# Patient Record
Sex: FEMALE | Race: NATIVE HAWAIIAN OR OTHER PACIFIC ISLANDER | NOT HISPANIC OR LATINO
[De-identification: names, ages, dates, MRNs, and addresses within clinical notes are randomized per-mention and may not be internally consistent; named-entity substitution may affect disease eponyms.]

---

## 2018-05-29 ENCOUNTER — TRANSCRIPTION ENCOUNTER (OUTPATIENT)
Age: 63
End: 2018-05-29

## 2018-06-03 ENCOUNTER — TRANSCRIPTION ENCOUNTER (OUTPATIENT)
Age: 63
End: 2018-06-03

## 2018-07-30 ENCOUNTER — TRANSCRIPTION ENCOUNTER (OUTPATIENT)
Age: 63
End: 2018-07-30

## 2018-07-31 PROBLEM — Z00.00 ENCOUNTER FOR PREVENTIVE HEALTH EXAMINATION: Status: ACTIVE | Noted: 2018-07-31

## 2018-11-01 ENCOUNTER — TRANSCRIPTION ENCOUNTER (OUTPATIENT)
Age: 63
End: 2018-11-01

## 2019-01-07 ENCOUNTER — TRANSCRIPTION ENCOUNTER (OUTPATIENT)
Age: 64
End: 2019-01-07

## 2019-08-20 ENCOUNTER — TRANSCRIPTION ENCOUNTER (OUTPATIENT)
Age: 64
End: 2019-08-20

## 2020-06-10 ENCOUNTER — TRANSCRIPTION ENCOUNTER (OUTPATIENT)
Age: 65
End: 2020-06-10

## 2020-10-31 ENCOUNTER — TRANSCRIPTION ENCOUNTER (OUTPATIENT)
Age: 65
End: 2020-10-31

## 2024-04-24 ENCOUNTER — INPATIENT (INPATIENT)
Facility: HOSPITAL | Age: 69
LOS: 1 days | Discharge: HOME | DRG: 596 | End: 2024-04-26
Attending: INTERNAL MEDICINE | Admitting: INTERNAL MEDICINE
Payer: MEDICARE

## 2024-04-24 VITALS
RESPIRATION RATE: 18 BRPM | DIASTOLIC BLOOD PRESSURE: 75 MMHG | OXYGEN SATURATION: 96 % | SYSTOLIC BLOOD PRESSURE: 131 MMHG | HEIGHT: 63 IN | WEIGHT: 160.06 LBS | TEMPERATURE: 98 F | HEART RATE: 80 BPM

## 2024-04-24 LAB
ALBUMIN SERPL ELPH-MCNC: 4.3 G/DL — SIGNIFICANT CHANGE UP (ref 3.3–5)
ALP SERPL-CCNC: 70 U/L — SIGNIFICANT CHANGE UP (ref 40–120)
ALT FLD-CCNC: 98 U/L — HIGH (ref 10–45)
ANION GAP SERPL CALC-SCNC: 14 MMOL/L — SIGNIFICANT CHANGE UP (ref 5–17)
AST SERPL-CCNC: 66 U/L — HIGH (ref 10–40)
BASE EXCESS BLDV CALC-SCNC: 2.9 MMOL/L — SIGNIFICANT CHANGE UP (ref -2–3)
BASOPHILS # BLD AUTO: 0 K/UL — SIGNIFICANT CHANGE UP (ref 0–0.2)
BASOPHILS NFR BLD AUTO: 0 % — SIGNIFICANT CHANGE UP (ref 0–2)
BILIRUB SERPL-MCNC: 0.5 MG/DL — SIGNIFICANT CHANGE UP (ref 0.2–1.2)
BUN SERPL-MCNC: 12 MG/DL — SIGNIFICANT CHANGE UP (ref 7–23)
CA-I SERPL-SCNC: 1.19 MMOL/L — SIGNIFICANT CHANGE UP (ref 1.15–1.33)
CALCIUM SERPL-MCNC: 10 MG/DL — SIGNIFICANT CHANGE UP (ref 8.4–10.5)
CHLORIDE BLDV-SCNC: 98 MMOL/L — SIGNIFICANT CHANGE UP (ref 96–108)
CHLORIDE SERPL-SCNC: 97 MMOL/L — SIGNIFICANT CHANGE UP (ref 96–108)
CO2 BLDV-SCNC: 31 MMOL/L — HIGH (ref 22–26)
CO2 SERPL-SCNC: 23 MMOL/L — SIGNIFICANT CHANGE UP (ref 22–31)
CREAT SERPL-MCNC: 0.76 MG/DL — SIGNIFICANT CHANGE UP (ref 0.5–1.3)
EGFR: 86 ML/MIN/1.73M2 — SIGNIFICANT CHANGE UP
EOSINOPHIL # BLD AUTO: 0.18 K/UL — SIGNIFICANT CHANGE UP (ref 0–0.5)
EOSINOPHIL NFR BLD AUTO: 2 % — SIGNIFICANT CHANGE UP (ref 0–6)
GAS PNL BLDV: 130 MMOL/L — LOW (ref 136–145)
GAS PNL BLDV: SIGNIFICANT CHANGE UP
GAS PNL BLDV: SIGNIFICANT CHANGE UP
GLUCOSE BLDV-MCNC: 315 MG/DL — HIGH (ref 70–99)
GLUCOSE SERPL-MCNC: 289 MG/DL — HIGH (ref 70–99)
HCO3 BLDV-SCNC: 30 MMOL/L — HIGH (ref 22–29)
HCT VFR BLD CALC: 46.3 % — HIGH (ref 34.5–45)
HCT VFR BLDA CALC: 48 % — HIGH (ref 34.5–46.5)
HGB BLD CALC-MCNC: 16 G/DL — SIGNIFICANT CHANGE UP (ref 11.7–16.1)
HGB BLD-MCNC: 15.6 G/DL — HIGH (ref 11.5–15.5)
LACTATE BLDV-MCNC: 1.4 MMOL/L — SIGNIFICANT CHANGE UP (ref 0.5–2)
LYMPHOCYTES # BLD AUTO: 2.87 K/UL — SIGNIFICANT CHANGE UP (ref 1–3.3)
LYMPHOCYTES # BLD AUTO: 32 % — SIGNIFICANT CHANGE UP (ref 13–44)
MANUAL SMEAR VERIFICATION: SIGNIFICANT CHANGE UP
MCHC RBC-ENTMCNC: 29.6 PG — SIGNIFICANT CHANGE UP (ref 27–34)
MCHC RBC-ENTMCNC: 33.7 GM/DL — SIGNIFICANT CHANGE UP (ref 32–36)
MCV RBC AUTO: 87.9 FL — SIGNIFICANT CHANGE UP (ref 80–100)
MONOCYTES # BLD AUTO: 0.45 K/UL — SIGNIFICANT CHANGE UP (ref 0–0.9)
MONOCYTES NFR BLD AUTO: 5 % — SIGNIFICANT CHANGE UP (ref 2–14)
NEUTROPHILS # BLD AUTO: 5.2 K/UL — SIGNIFICANT CHANGE UP (ref 1.8–7.4)
NEUTROPHILS NFR BLD AUTO: 58 % — SIGNIFICANT CHANGE UP (ref 43–77)
NRBC # BLD: 0 /100 WBCS — SIGNIFICANT CHANGE UP (ref 0–0)
PCO2 BLDV: 51 MMHG — HIGH (ref 39–42)
PH BLDV: 7.37 — SIGNIFICANT CHANGE UP (ref 7.32–7.43)
PLAT MORPH BLD: NORMAL — SIGNIFICANT CHANGE UP
PLATELET # BLD AUTO: 243 K/UL — SIGNIFICANT CHANGE UP (ref 150–400)
PO2 BLDV: 28 MMHG — SIGNIFICANT CHANGE UP (ref 25–45)
POTASSIUM BLDV-SCNC: 5.5 MMOL/L — HIGH (ref 3.5–5.1)
POTASSIUM SERPL-MCNC: 4.7 MMOL/L — SIGNIFICANT CHANGE UP (ref 3.5–5.3)
POTASSIUM SERPL-SCNC: 4.7 MMOL/L — SIGNIFICANT CHANGE UP (ref 3.5–5.3)
PROT SERPL-MCNC: 7.9 G/DL — SIGNIFICANT CHANGE UP (ref 6–8.3)
RBC # BLD: 5.27 M/UL — HIGH (ref 3.8–5.2)
RBC # FLD: 12 % — SIGNIFICANT CHANGE UP (ref 10.3–14.5)
RBC BLD AUTO: NORMAL — SIGNIFICANT CHANGE UP
SAO2 % BLDV: 42.8 % — LOW (ref 67–88)
SODIUM SERPL-SCNC: 134 MMOL/L — LOW (ref 135–145)
VARIANT LYMPHS # BLD: 3 % — SIGNIFICANT CHANGE UP (ref 0–6)
WBC # BLD: 8.97 K/UL — SIGNIFICANT CHANGE UP (ref 3.8–10.5)
WBC # FLD AUTO: 8.97 K/UL — SIGNIFICANT CHANGE UP (ref 3.8–10.5)

## 2024-04-24 PROCEDURE — 99285 EMERGENCY DEPT VISIT HI MDM: CPT

## 2024-04-24 RX ORDER — KETOROLAC TROMETHAMINE 30 MG/ML
15 SYRINGE (ML) INJECTION ONCE
Refills: 0 | Status: DISCONTINUED | OUTPATIENT
Start: 2024-04-24 | End: 2024-04-24

## 2024-04-24 RX ORDER — ACYCLOVIR SODIUM 500 MG
700 VIAL (EA) INTRAVENOUS ONCE
Refills: 0 | Status: COMPLETED | OUTPATIENT
Start: 2024-04-24 | End: 2024-04-24

## 2024-04-24 RX ADMIN — Medication 15 MILLIGRAM(S): at 22:57

## 2024-04-24 NOTE — ED PROVIDER NOTE - RAPID ASSESSMENT
68-year-old female with past medical history of diabetes presenting with rash.  Patient reports that she developed a painful vaginal rash 1 week ago.  Patient went to urgent care and was prescribed Keflex without improvement.  Patient followed up with OB/GYN today.  Patient was told that she has a severe herpetic rash and she needed to come to the emergency room for IV antiviral medication.    **Patient was rapidly assessed by me, Sal Cano PA-C. A limited history was obtained. The patient will be seen and further examined/worked up in the main ED and their care will be completed by the main ED team. Receiving team will follow up on labs, analgesia, any clinical imaging, and perform reassessment and disposition of the patient as clinically indicated. All decisions regarding the progression of care will be made at their discretion.

## 2024-04-24 NOTE — ED ADULT NURSE NOTE - NSFALLUNIVINTERV_ED_ALL_ED
Bed/Stretcher in lowest position, wheels locked, appropriate side rails in place/Call bell, personal items and telephone in reach/Instruct patient to call for assistance before getting out of bed/chair/stretcher/Non-slip footwear applied when patient is off stretcher/[place] to call system/Physically safe environment - no spills, clutter or unnecessary equipment/Purposeful proactive rounding/Room/bathroom lighting operational, light cord in reach

## 2024-04-24 NOTE — ED PROVIDER NOTE - ATTENDING APP SHARED VISIT CONTRIBUTION OF CARE
Private Physician Rio Cordova MD Obstetrics and Gynecology [phone]  68y f pmh DMT2 on metformin/insulin. HLD, No habits, cva, coronary artery disease,cancer,habits. Pt comes to ed c/o 6d hx painful rash starting 6d ago groin tx w keflex w/o relief. Pt was seen by GYN today and referred to ed for IV antiviral. Tmax tactile yesterday. Pt referred to ed for admission and iv antivirals. Private Physician Rio Cordova MD Obstetrics and Gynecology [phone]  68y f pmh DMT2 on metformin/insulin. HLD, No habits, cva, coronary artery disease,cancer,habits. Pt comes to ed c/o 6d hx painful rash starting 6d ago groin tx w keflex w/o relief. Pt was seen by GYN today and referred to ed for IV antiviral. Tmax tactile yesterday. Pt referred to ed for admission and iv antivirals. PT  one partner no prior hx herpes infection. Chickenpox age 32. PE WDWN female awake alert normocephalic atraumatic neck supple chest clear anterior & posterior cv no rubs, gallops or murmurs. neruo no focal defects. SKIN vesicular rash to groin rt >left w rash to rt buttox medially.   Florentino Sahni MD, Facep

## 2024-04-24 NOTE — ED PROVIDER NOTE - OBJECTIVE STATEMENT
67 y/o female PMHx DM on insulin now directed to the ED by GYN Dr Rio Cordova for IV anti-virals concern for significant herpes infection x1 week. patient reported the rash started out as a painful sore in right inner labia and then became diffuse extending to the right buttock. Patient had cultures taken during her pelvic exam today. Had subjective fevers and chills yesterday. Patient denied cough, sick contacts, CP, SOB, abdominal pain, urinary symptoms

## 2024-04-24 NOTE — ED PROVIDER NOTE - PROGRESS NOTE DETAILS
Endorsed to Dr BHARTI Sahni MD, Facep Bradford Cantu, PGY3 pt Bradford Cantu, PGY3 pt signed out to me. seen by obgyn. tba for iv antiviral and ID>

## 2024-04-24 NOTE — ED PROVIDER NOTE - CLINICAL SUMMARY MEDICAL DECISION MAKING FREE TEXT BOX
Adult female hx as above sent by PMD/GYN for concerns for genital herpes. Exam more cw zoster. less likely gential herpes. Plan check labs, cs gyn,IV acyclovir.Reassess  Florentino Sahni MD, Facep

## 2024-04-24 NOTE — ED ADULT NURSE NOTE - OBJECTIVE STATEMENT
69 y/o female, A&O x4, PMH DM presents to the ED c/o vaginal rash. Pt endorses 1 week of a painful vaginal rash. Pt was seen at  who prescribed Keflex w/o relief. Pt followed up with her OBGYN who told her to report to ED for IV antiviral. Pt affect is calm and appropriate, spontaneous unlabored breathing, abdomen soft nondistended nontender, erythematic over perineum. Pt denies chest pain, SOB/difficulty breathing, fever/chills, HA, abd pain, N/V/D, lightheadedness/dizziness, numbness/tingling. Safety and comfort measures maintained.

## 2024-04-25 DIAGNOSIS — E11.9 TYPE 2 DIABETES MELLITUS WITHOUT COMPLICATIONS: ICD-10-CM

## 2024-04-25 LAB
ADD ON TEST-SPECIMEN IN LAB: SIGNIFICANT CHANGE UP
ADD ON TEST-SPECIMEN IN LAB: SIGNIFICANT CHANGE UP
ANION GAP SERPL CALC-SCNC: 13 MMOL/L — SIGNIFICANT CHANGE UP (ref 5–17)
BASOPHILS # BLD AUTO: 0.06 K/UL — SIGNIFICANT CHANGE UP (ref 0–0.2)
BASOPHILS NFR BLD AUTO: 0.9 % — SIGNIFICANT CHANGE UP (ref 0–2)
BUN SERPL-MCNC: 14 MG/DL — SIGNIFICANT CHANGE UP (ref 7–23)
CALCIUM SERPL-MCNC: 9.4 MG/DL — SIGNIFICANT CHANGE UP (ref 8.4–10.5)
CHLORIDE SERPL-SCNC: 100 MMOL/L — SIGNIFICANT CHANGE UP (ref 96–108)
CO2 SERPL-SCNC: 22 MMOL/L — SIGNIFICANT CHANGE UP (ref 22–31)
CREAT SERPL-MCNC: 0.83 MG/DL — SIGNIFICANT CHANGE UP (ref 0.5–1.3)
EGFR: 78 ML/MIN/1.73M2 — SIGNIFICANT CHANGE UP
EOSINOPHIL # BLD AUTO: 0.2 K/UL — SIGNIFICANT CHANGE UP (ref 0–0.5)
EOSINOPHIL NFR BLD AUTO: 3 % — SIGNIFICANT CHANGE UP (ref 0–6)
GLUCOSE BLDC GLUCOMTR-MCNC: 240 MG/DL — HIGH (ref 70–99)
GLUCOSE BLDC GLUCOMTR-MCNC: 241 MG/DL — HIGH (ref 70–99)
GLUCOSE BLDC GLUCOMTR-MCNC: 281 MG/DL — HIGH (ref 70–99)
GLUCOSE BLDC GLUCOMTR-MCNC: 305 MG/DL — HIGH (ref 70–99)
GLUCOSE SERPL-MCNC: 436 MG/DL — HIGH (ref 70–99)
HCT VFR BLD CALC: 39.8 % — SIGNIFICANT CHANGE UP (ref 34.5–45)
HGB BLD-MCNC: 13.2 G/DL — SIGNIFICANT CHANGE UP (ref 11.5–15.5)
HSV+VZV DNA SPEC QL NAA+PROBE: ABNORMAL
IMM GRANULOCYTES NFR BLD AUTO: 0.3 % — SIGNIFICANT CHANGE UP (ref 0–0.9)
LYMPHOCYTES # BLD AUTO: 2.95 K/UL — SIGNIFICANT CHANGE UP (ref 1–3.3)
LYMPHOCYTES # BLD AUTO: 44.4 % — HIGH (ref 13–44)
MCHC RBC-ENTMCNC: 29.4 PG — SIGNIFICANT CHANGE UP (ref 27–34)
MCHC RBC-ENTMCNC: 33.2 GM/DL — SIGNIFICANT CHANGE UP (ref 32–36)
MCV RBC AUTO: 88.6 FL — SIGNIFICANT CHANGE UP (ref 80–100)
MONOCYTES # BLD AUTO: 0.69 K/UL — SIGNIFICANT CHANGE UP (ref 0–0.9)
MONOCYTES NFR BLD AUTO: 10.4 % — SIGNIFICANT CHANGE UP (ref 2–14)
NEUTROPHILS # BLD AUTO: 2.72 K/UL — SIGNIFICANT CHANGE UP (ref 1.8–7.4)
NEUTROPHILS NFR BLD AUTO: 41 % — LOW (ref 43–77)
NRBC # BLD: 0 /100 WBCS — SIGNIFICANT CHANGE UP (ref 0–0)
PLATELET # BLD AUTO: 218 K/UL — SIGNIFICANT CHANGE UP (ref 150–400)
POTASSIUM SERPL-MCNC: 4.5 MMOL/L — SIGNIFICANT CHANGE UP (ref 3.5–5.3)
POTASSIUM SERPL-SCNC: 4.5 MMOL/L — SIGNIFICANT CHANGE UP (ref 3.5–5.3)
RBC # BLD: 4.49 M/UL — SIGNIFICANT CHANGE UP (ref 3.8–5.2)
RBC # FLD: 12.1 % — SIGNIFICANT CHANGE UP (ref 10.3–14.5)
SODIUM SERPL-SCNC: 135 MMOL/L — SIGNIFICANT CHANGE UP (ref 135–145)
SPECIMEN SOURCE: SIGNIFICANT CHANGE UP
WBC # BLD: 6.64 K/UL — SIGNIFICANT CHANGE UP (ref 3.8–10.5)
WBC # FLD AUTO: 6.64 K/UL — SIGNIFICANT CHANGE UP (ref 3.8–10.5)

## 2024-04-25 PROCEDURE — 99222 1ST HOSP IP/OBS MODERATE 55: CPT | Mod: GC

## 2024-04-25 PROCEDURE — 99223 1ST HOSP IP/OBS HIGH 75: CPT

## 2024-04-25 RX ORDER — DEXTROSE 10 % IN WATER 10 %
125 INTRAVENOUS SOLUTION INTRAVENOUS ONCE
Refills: 0 | Status: DISCONTINUED | OUTPATIENT
Start: 2024-04-25 | End: 2024-04-26

## 2024-04-25 RX ORDER — INSULIN LISPRO 100/ML
VIAL (ML) SUBCUTANEOUS AT BEDTIME
Refills: 0 | Status: DISCONTINUED | OUTPATIENT
Start: 2024-04-25 | End: 2024-04-26

## 2024-04-25 RX ORDER — INSULIN GLARGINE 100 [IU]/ML
14 INJECTION, SOLUTION SUBCUTANEOUS ONCE
Refills: 0 | Status: COMPLETED | OUTPATIENT
Start: 2024-04-25 | End: 2024-04-25

## 2024-04-25 RX ORDER — ACYCLOVIR SODIUM 500 MG
700 VIAL (EA) INTRAVENOUS EVERY 8 HOURS
Refills: 0 | Status: DISCONTINUED | OUTPATIENT
Start: 2024-04-25 | End: 2024-04-26

## 2024-04-25 RX ORDER — MORPHINE SULFATE 50 MG/1
4 CAPSULE, EXTENDED RELEASE ORAL ONCE
Refills: 0 | Status: DISCONTINUED | OUTPATIENT
Start: 2024-04-25 | End: 2024-04-25

## 2024-04-25 RX ORDER — ACETAMINOPHEN 500 MG
975 TABLET ORAL EVERY 6 HOURS
Refills: 0 | Status: DISCONTINUED | OUTPATIENT
Start: 2024-04-25 | End: 2024-04-26

## 2024-04-25 RX ORDER — SODIUM CHLORIDE 9 MG/ML
1000 INJECTION INTRAMUSCULAR; INTRAVENOUS; SUBCUTANEOUS ONCE
Refills: 0 | Status: COMPLETED | OUTPATIENT
Start: 2024-04-25 | End: 2024-04-25

## 2024-04-25 RX ORDER — INSULIN LISPRO 100/ML
VIAL (ML) SUBCUTANEOUS
Refills: 0 | Status: DISCONTINUED | OUTPATIENT
Start: 2024-04-25 | End: 2024-04-26

## 2024-04-25 RX ORDER — KETOROLAC TROMETHAMINE 30 MG/ML
15 SYRINGE (ML) INJECTION ONCE
Refills: 0 | Status: DISCONTINUED | OUTPATIENT
Start: 2024-04-25 | End: 2024-04-25

## 2024-04-25 RX ORDER — DEXTROSE 50 % IN WATER 50 %
15 SYRINGE (ML) INTRAVENOUS ONCE
Refills: 0 | Status: DISCONTINUED | OUTPATIENT
Start: 2024-04-25 | End: 2024-04-26

## 2024-04-25 RX ORDER — DEXTROSE 50 % IN WATER 50 %
12.5 SYRINGE (ML) INTRAVENOUS ONCE
Refills: 0 | Status: DISCONTINUED | OUTPATIENT
Start: 2024-04-25 | End: 2024-04-26

## 2024-04-25 RX ORDER — GLUCAGON INJECTION, SOLUTION 0.5 MG/.1ML
1 INJECTION, SOLUTION SUBCUTANEOUS ONCE
Refills: 0 | Status: DISCONTINUED | OUTPATIENT
Start: 2024-04-25 | End: 2024-04-26

## 2024-04-25 RX ORDER — DEXTROSE 50 % IN WATER 50 %
25 SYRINGE (ML) INTRAVENOUS ONCE
Refills: 0 | Status: DISCONTINUED | OUTPATIENT
Start: 2024-04-25 | End: 2024-04-26

## 2024-04-25 RX ORDER — SODIUM CHLORIDE 9 MG/ML
1000 INJECTION, SOLUTION INTRAVENOUS
Refills: 0 | Status: DISCONTINUED | OUTPATIENT
Start: 2024-04-25 | End: 2024-04-26

## 2024-04-25 RX ORDER — KETOROLAC TROMETHAMINE 30 MG/ML
15 SYRINGE (ML) INJECTION EVERY 6 HOURS
Refills: 0 | Status: DISCONTINUED | OUTPATIENT
Start: 2024-04-25 | End: 2024-04-26

## 2024-04-25 RX ORDER — INSULIN LISPRO 100/ML
6 VIAL (ML) SUBCUTANEOUS ONCE
Refills: 0 | Status: COMPLETED | OUTPATIENT
Start: 2024-04-25 | End: 2024-04-25

## 2024-04-25 RX ORDER — HEPARIN SODIUM 5000 [USP'U]/ML
5000 INJECTION INTRAVENOUS; SUBCUTANEOUS EVERY 8 HOURS
Refills: 0 | Status: DISCONTINUED | OUTPATIENT
Start: 2024-04-25 | End: 2024-04-26

## 2024-04-25 RX ORDER — INSULIN LISPRO 100/ML
5 VIAL (ML) SUBCUTANEOUS
Refills: 0 | Status: DISCONTINUED | OUTPATIENT
Start: 2024-04-25 | End: 2024-04-26

## 2024-04-25 RX ADMIN — Medication 2: at 09:11

## 2024-04-25 RX ADMIN — Medication 114 MILLIGRAM(S): at 00:18

## 2024-04-25 RX ADMIN — INSULIN GLARGINE 14 UNIT(S): 100 INJECTION, SOLUTION SUBCUTANEOUS at 03:06

## 2024-04-25 RX ADMIN — Medication 15 MILLIGRAM(S): at 03:06

## 2024-04-25 RX ADMIN — Medication 1: at 21:39

## 2024-04-25 RX ADMIN — MORPHINE SULFATE 4 MILLIGRAM(S): 50 CAPSULE, EXTENDED RELEASE ORAL at 09:08

## 2024-04-25 RX ADMIN — Medication 5 UNIT(S): at 12:24

## 2024-04-25 RX ADMIN — Medication 975 MILLIGRAM(S): at 16:22

## 2024-04-25 RX ADMIN — Medication 975 MILLIGRAM(S): at 22:30

## 2024-04-25 RX ADMIN — Medication 6 UNIT(S): at 03:02

## 2024-04-25 RX ADMIN — Medication 15 MILLIGRAM(S): at 03:21

## 2024-04-25 RX ADMIN — Medication 114 MILLIGRAM(S): at 08:34

## 2024-04-25 RX ADMIN — Medication 2: at 17:35

## 2024-04-25 RX ADMIN — HEPARIN SODIUM 5000 UNIT(S): 5000 INJECTION INTRAVENOUS; SUBCUTANEOUS at 16:22

## 2024-04-25 RX ADMIN — Medication 4: at 12:22

## 2024-04-25 RX ADMIN — Medication 114 MILLIGRAM(S): at 16:21

## 2024-04-25 RX ADMIN — Medication 5 UNIT(S): at 09:11

## 2024-04-25 RX ADMIN — SODIUM CHLORIDE 1000 MILLILITER(S): 9 INJECTION INTRAMUSCULAR; INTRAVENOUS; SUBCUTANEOUS at 03:02

## 2024-04-25 RX ADMIN — Medication 15 MILLIGRAM(S): at 12:25

## 2024-04-25 RX ADMIN — HEPARIN SODIUM 5000 UNIT(S): 5000 INJECTION INTRAVENOUS; SUBCUTANEOUS at 21:40

## 2024-04-25 NOTE — ED ADULT NURSE REASSESSMENT NOTE - NSFALLUNIVINTERV_ED_ALL_ED
Bed/Stretcher in lowest position, wheels locked, appropriate side rails in place/Call bell, personal items and telephone in reach/Instruct patient to call for assistance before getting out of bed/chair/stretcher/Non-slip footwear applied when patient is off stretcher/[place] to call system/Physically safe environment - no spills, clutter or unnecessary equipment/Purposeful proactive rounding/Room/bathroom lighting operational, light cord in reach

## 2024-04-25 NOTE — ED CDU PROVIDER INITIAL DAY NOTE - DETAILS
antivirals IV, pain control as needed  contact isolation  gyn consulted  ID consult pending   DW ED team, vitals every 4 hours, frequent reevaluations

## 2024-04-25 NOTE — ED CDU PROVIDER INITIAL DAY NOTE - OBJECTIVE STATEMENT
69 y/o female PMHx DM on insulin now directed to the ED by GYN Dr Rio Cordova for IV anti-virals concern for significant herpes infection x1 week. Patient reported the rash started out as a painful sore in right inner labia and then became diffuse extending to the right buttock. Patient had cultures taken during her pelvic exam today. Had subjective fevers and chills yesterday. Patient denied cough, sick contacts, CP, SOB, abdominal pain, urinary symptoms.  ED course: Afebrile, WBC 8.97. Started on Acyclovir. Evaluated by GYN. Concern for shingles. Plan for continued antivirals and ID consult in CDU.

## 2024-04-25 NOTE — ED CDU PROVIDER INITIAL DAY NOTE - PROGRESS NOTE DETAILS
Per patient, home regimen of insulin:  Apidra 20 units with meals 2x a day, was originally taking 3x a day with meals but states that taking three doses daily made her sugars too low. Last took dose yesterday morning 4/24.  Soliqua 100/33, 45 units every other day. Last took dose 4/23.   Endocrinologist: Dr. Damion íDaz.  Patient reports eating a sandwich in the ER, did not receive insulin coverage. Will order weight base Lantus and mealtime Admelog doses with ISS and give STAT Admelog/Lantus and IVF at this time. Will consider Endocrine consult in the morning. - Elisabeth Mcclain PA-C patient seen and evaluated at bedside this morning. states still has some discomfort at this time. ID paged. awaiting call back. discussed with Dr. Lockwood patient seen and evaluated at bedside this morning. states having really bad pain at this time. tearing from pain. will give morphine IV. ID paged. awaiting call back. discussed with Dr. Lockwood ID repaged. awaiting call back. lesions examined with nurse Lita. lesions noted to right side of labia extending around to right side of buttock. some with crusting present. some open lesions. no bleeding present. will admit patient for further eval and management. discussed with Dr. Lockwood.

## 2024-04-25 NOTE — CONSULT NOTE ADULT - ASSESSMENT
67yo F PMHx IDDM who sent to the ED for IV anti-virals due to concern for labial herpes infection. Her rash started as a painful sore in the R inner labia which she first noticed on 4/18. She went to an urgent care who prescribed her cephalexin and mupirocin ointment, with continued worsening of the rash which then extended to R buttock with associated fevers/chills.    She has not been sexually active for the past 10 years and had chickenpox diagnosed at age 32 while pregnant. She has not previously received the Shingles vaccine.    She has a vesicular rash on erythematous base involving the S2 dermatome on R buttocks, mostly crusted over, and R vulvar swelling    On admission she is afebrile without leukocytosis. She is hyperglycemic, otherwise labs are grossly normal. PCR for HSV/VZV pending.    Micro:  HSV/VZV PCR: pending    Abx:  IV acyclovir (10 mg/kg) 4/25 --> 69yo F PMHx IDDM who sent to the ED for IV anti-virals due to concern for labial herpes infection. Her rash started as a painful sore in the R inner labia which she first noticed on 4/18. She went to an urgent care who prescribed her cephalexin and mupirocin ointment, with continued worsening of the rash which then extended to R buttock with associated fevers/chills.    She has not been sexually active for the past 10 years and had chickenpox diagnosed at age 32 while pregnant. She has not previously received the Shingles vaccine.    She has a vesicular rash on erythematous base involving the S2 dermatome on R buttocks, mostly crusted over, and R vulvar swelling    On admission she is afebrile without leukocytosis. She is hyperglycemic, otherwise labs are grossly normal. PCR of a lesion is reactive for VZV.    Micro:  HSV/VZV PCR: pending    Abx:  IV acyclovir (10 mg/kg) 4/25 -->    #Shingles    Lesions involve one dermatome, lesions are already crusting over and symptomatically she is improving  Uncontrolled diabetes but she is not immunocompromised.    Recommendations:  - dc acyclovir and start PO valtrex 1g TID  - keep lesions covered until all are crusted over  - standard precautions advised  - will plan for 7 days total treatment    d/w attending.    Terell Ceballos, PGY4  ID Fellow  [facility] Teams Preferred  After 5pm/weekends call [phone]

## 2024-04-25 NOTE — PATIENT PROFILE ADULT - FALL HARM RISK - DATE OF FALL
Medicare Wellness Visit  Plan for Preventive Care    A good way for you to stay healthy is to use preventive care.  Medicare covers many services that can help you stay healthy.* The goal of these services is to find any health problems as quickly as possible. Finding problems early can help make them easier to treat.  Your personal plan below lists the services you may need and when they are due.     Health Maintenance Summary     Topic Due On Due Status Completed On    Colorectal Cancer Screening - Colonoscopy Feb 19, 2019 Not Due Feb 19, 2014    Immunization-Zoster Jun 13, 2007 Overdue     Immunization - Pneumococcal  Completed Jun 29, 2015    Medicare Wellness Visit Aug 18, 2018 Not Due Aug 18, 2017    IMMUNIZATION - DTaP/Tdap/Td Jul 28, 2021 Not Due Jul 28, 2011    Immunization-Influenza Sep 1, 2017 Not Due Nov 22, 2016           Preventive Care for Women and Men    Heart Screenings (Cardiovascular):  · Blood tests are used to check your cholesterol, lipid and triglyceride levels. High levels can increase your risk for heart disease and stroke. High levels can be treated with medications, diet and exercise. Lowering your levels can help keep your heart and blood vessels healthy.  Your provider will order these tests if they are needed.    · An ultrasound is done to see if you have an abdominal aortic aneurysm (AAA).  This is an enlargement of one of the main blood vessels that delivers blood to the body.   In the [place], 9,000 deaths are caused by AAA.  You may not even know you have this problem and as many as 1 in 3 people will have a serious problem if it is not treated.  Early diagnosis allows for more effective treatment and cure.  If you have a family history of AAA or are a male age 65-75 who has smoked, you are at higher risk of an AAA.  Your provider can order this test, if needed.    Colorectal Screening:  · There are many tests that are used to check for cancer of your colon and rectum. You  and your provider should discuss what test is best for you and when to have it done.  Options include:  · Screening Colonoscopy: exam of the entire colon, seen through a flexible lighted tube.  · Flexible Sigmoidoscopy: exam of the last third (sigmoid portion) of the colon and rectum, seen through a flexible lighted tube.  · Cologuard DNA stool test: a sample of your stool is used to screen for cancer and unseen blood in your stool.  · Fecal Occult Blood Test: a sample of your stool is studied to find any unseen blood    Flu Shot:  · An immunization that helps to prevent influenza (the flu). You should get this every year. The best time to get the shot is in the fall.    Pneumococcal Shot:  • Vaccines are available that can help prevent pneumococcal disease, which is any type of infection caused by Streptococcus pneumoniae bacteria.   Their use can prevent some cases of pneumonia, meningitis, and sepsis. There are two types of pneumococcal vaccines:   o Conjugate vaccines (PCV-13 or Prevnar 13®) - helps protect against the 13 types of pneumococcal bacteria that are the most common causes of serious infections in children and adults.    o Polysaccharide vaccine (PPSV23 or Ixqfmvvmc62®) - helps protect against 23 types of pneumococcal bacteria for patients who are recommended to get it.  These vaccines should be given at least 12 months apart.  A booster is usually not needed.     Hepatitis B Shot:  · An immunization that helps to protect people from getting Hepatitis B. Hepatitis B is a virus that spreads through contact with infected blood or body fluids. Many people with the virus do not have symptoms.  The virus can lead to serious problems, such as liver disease. Some people are at higher risk than others. Your doctor will tell you if you need this shot.     Diabetes Screening:  · A test to measure sugar (glucose) in your blood is called a fasting blood sugar. Fasting means you cannot have food or drink for at  least 8 hours before the test. This test can detect diabetes long before you may notice symptoms.    Glaucoma Screening:  · Glaucoma screening is performed by your eye doctor. The test measures the fluid pressure inside your eyes to determine if you have glaucoma.     Hepatitis C Screening:  · A blood test to see if you have the hepatitis C virus.  Hepatitis C attacks the liver and is a major cause of chronic liver disease.  Medicare will cover a single screening for all adults born between 1945 & 1965, or high risk patients (people who have injected illegal drugs or people who have had blood transfusions).  High risk patients who continue to inject illegal drugs can be screened for Hepatitis C every year.    Smoking and Tobacco-Use Cessation Counseling:  · Tobacco is the single greatest cause of disease and early death in our country today. Medication and counseling together can increase a person’s chance of quitting for good.   · Medicare covers two quitting attempts per year, with four counseling sessions per attempt (eight sessions in a 12 month period)    Preventive Screening tests for Women    Screening Mammograms and Breast Exams:  · An x-ray of your breasts to check for breast cancer before you or your doctor may be able to feel it.  If breast cancer is found early it can usually be treated with success.    Pelvic Exams and Pap Tests:  · An exam to check for cervical and vaginal cancer. A Pap test is a lab test in which cells are taken from your cervix and sent to the lab to look for signs of cervical cancer. If cancer of the cervix is found early, chances for a cure are good. Testing can generally end at age 65, or if a woman has a hysterectomy for a benign condition. Your provider may recommend more frequent testing if certain abnormal results are found.    Bone Mass Measurements:  · A painless x-ray of your bone density to see if you are at risk for a broken bone. Bone density refers to the thickness of  bones or how tightly the bone tissue is packed.    Preventive Screening tests for Men    Prostate Screening:  · PSA - Prostate Cancer blood test.  Experts do not recommend routine screening of healthy men with no signs or symptoms of prostate disease.  However, men should not ignore urinary symptoms, and should discuss their family history with their doctor.    *Medicare pays for many preventive services to keep you healthy. For some of these services, you might have to pay a deductible, coinsurance, and / or copayment.  The amounts vary depending on the type of services you need and the kind of Medicare health plan you have.    Your next Medicare Wellness visit with the nurse will be due after 8/1/2018.  Please try and schedule this in conjunction with an appointment with your primary care physician so both can be done at the same visit.  Please make sure you get your lab done approximately 1 week before your visit.           26-Dec-2023

## 2024-04-25 NOTE — H&P ADULT - NSHPLABSRESULTS_GEN_ALL_CORE
Lab Results:  CBC  CBC Full  -  ( 25 Apr 2024 05:43 )  WBC Count : 6.64 K/uL  RBC Count : 4.49 M/uL  Hemoglobin : 13.2 g/dL  Hematocrit : 39.8 %  Platelet Count - Automated : 218 K/uL  Mean Cell Volume : 88.6 fl  Mean Cell Hemoglobin : 29.4 pg  Mean Cell Hemoglobin Concentration : 33.2 gm/dL  Auto Neutrophil # : 2.72 K/uL  Auto Lymphocyte # : 2.95 K/uL  Auto Monocyte # : 0.69 K/uL  Auto Eosinophil # : 0.20 K/uL  Auto Basophil # : 0.06 K/uL  Auto Neutrophil % : 41.0 %  Auto Lymphocyte % : 44.4 %  Auto Monocyte % : 10.4 %  Auto Eosinophil % : 3.0 %  Auto Basophil % : 0.9 %    .		Differential:	[] Automated		[] Manual  Chemistry                        13.2   6.64  )-----------( 218      ( 25 Apr 2024 05:43 )             39.8     04-25    135  |  100  |  14  ----------------------------<  436<H>  4.5   |  22  |  0.83    Ca    9.4      25 Apr 2024 01:35    TPro  6.7  /  Alb  4.0  /  TBili  0.4  /  DBili  0.1  /  AST  44<H>  /  ALT  84<H>  /  AlkPhos  71  04-25    LIVER FUNCTIONS - ( 25 Apr 2024 01:35 )  Alb: 4.0 g/dL / Pro: 6.7 g/dL / ALK PHOS: 71 U/L / ALT: 84 U/L / AST: 44 U/L / GGT: x             Urinalysis Basic - ( 25 Apr 2024 01:35 )    Color: x / Appearance: x / SG: x / pH: x  Gluc: 436 mg/dL / Ketone: x  / Bili: x / Urobili: x   Blood: x / Protein: x / Nitrite: x   Leuk Esterase: x / RBC: x / WBC x   Sq Epi: x / Non Sq Epi: x / Bacteria: x            MICROBIOLOGY/CULTURES:      RADIOLOGY RESULTS: reviewed

## 2024-04-25 NOTE — CONSULT NOTE ADULT - ASSESSMENT
A/P: 69yo postmenopausal  (LMP age 57) presenting with diffuse rash in groin with dermatomal distribution on R labia and R buttock c/w shingles. In the ED, pt afebrile, VS grossly wnl, labs with elevated LFTs 66/98 but otherwise grossly wnl without leukocytosis. Pt hemodynamically stable.   - f/u viral and bacterial swab obtained  - Pain management with Tylenol/Toradol per ED  - Management of shingles with antiviral per ED    D/w Dr. Benjamin Lopez, PGY-2   A/P: 67yo postmenopausal  (LMP age 57) presenting with diffuse rash in groin with dermatomal distribution on R labia and R buttock c/w shingles. In the ED, pt afebrile, VS grossly wnl, labs with elevated LFTs 66/98 but otherwise grossly wnl without leukocytosis. Pt hemodynamically stable.   - f/u viral and bacterial swab obtained  - Pain management with Tylenol/Toradol per ED  - Management of shingles with antiviral per ED    D/w Dr. Benjamin Lopez, PGY-2

## 2024-04-25 NOTE — ED CDU PROVIDER DISPOSITION NOTE - CLINICAL COURSE
67 y/o female PMHx DM on insulin now directed to the ED by GYN Dr Rio Cordova for IV anti-virals concern for significant herpes infection x1 week. Patient reported the rash started out as a painful sore in right inner labia and then became diffuse extending to the right buttock. Patient had cultures taken during her pelvic exam today. Had subjective fevers and chills yesterday. Patient denied cough, sick contacts, CP, SOB, abdominal pain, urinary symptoms.  ED course: Afebrile, WBC 8.97. Started on Acyclovir. Evaluated by GYN. Concern for shingles. Plan for continued antivirals and ID consult in CDU. 69 y/o female PMHx DM on insulin now directed to the ED by GYN Dr Rio Cordova for IV anti-virals concern for significant herpes infection x1 week. Patient reported the rash started out as a painful sore in right inner labia and then became diffuse extending to the right buttock. Patient had cultures taken during her pelvic exam today. Had subjective fevers and chills yesterday. Patient denied cough, sick contacts, CP, SOB, abdominal pain, urinary symptoms.  ED course: Afebrile, WBC 8.97. Started on Acyclovir. Evaluated by GYN. Concern for shingles. Plan for continued antivirals and ID consult in CDU.  in CDU, patient given antivirals. given morphine due to pain. ID consulted. awaiting call back. due to patient presenting symptoms and hx will admit patient to medicine for further eval and management. discussed with Dr. Lockwood.

## 2024-04-25 NOTE — H&P ADULT - HISTORY OF PRESENT ILLNESS
67yo F PMHx IDDM who sent to the ED for IV anti-virals due to concern for labial herpes infection. Her rash started as a painful sore in the R inner labia which she first noticed on 4/18. She went to an urgent care who prescribed her cephalexin and mupirocin ointment, with continued worsening of the rash which then extended to R buttock with associated fevers/chills. She has not been sexually active for the past 10 years and had chickenpox diagnosed at age 32 while pregnant. She has not previously received the Shingles vaccine.

## 2024-04-25 NOTE — PATIENT PROFILE ADULT - FALL HARM RISK - RISK INTERVENTIONS

## 2024-04-25 NOTE — ED CDU PROVIDER INITIAL DAY NOTE - ATTENDING APP SHARED VISIT CONTRIBUTION OF CARE
I have personally performed a face to face diagnostic evaluation on this patient.  I have reviewed the ACP note and agree with the history, exam, and plan of care, except as noted.  History and Exam by me shows See ED provider note  Florentino Sahni MD, Facep

## 2024-04-25 NOTE — H&P ADULT - NSHPREVIEWOFSYSTEMS_GEN_ALL_CORE
REVIEW OF SYSTEMS:    CONSTITUTIONAL: No weakness, fevers or chills  EYES/ENT: No visual changes;  No vertigo or throat pain   NECK: No pain or stiffness  RESPIRATORY: No cough, wheezing, hemoptysis; No shortness of breath  CARDIOVASCULAR: No chest pain or palpitations  GASTROINTESTINAL: No abdominal or epigastric pain. No nausea, vomiting, or hematemesis; No diarrhea or constipation. No melena or hematochezia.  GENITOURINARY: No dysuria, frequency or hematuria  NEUROLOGICAL: No numbness or weakness  SKIN: No itching, burning, rashes, or lesions   All other review of systems is negative unless indicated above. PERRL/conjunctiva clear

## 2024-04-25 NOTE — CONSULT NOTE ADULT - SUBJECTIVE AND OBJECTIVE BOX
Patient is a 68y old  Female who presents with a chief complaint of   HPI:    67yo F PMHx IDDM who sent to the ED for IV anti-virals due to concern for labial herpes infection. Her rash started as a painful sore in the R inner labia which she first noticed on 4/18. She went to an urgent care who prescribed her cephalexin and mupirocin ointment, with continued worsening of the rash which then extended to R buttock with associated fevers/chills. She has not been sexually active for the past 10 years and had chickenpox diagnosed at age 32 while pregnant. She has not previously received the Shingles vaccine.       REVIEW OF SYSTEMS  Constitutional: +resolved fevers/chills, no weight loss or fatigue   Skin: +skin rash	  Eyes: No discharge	  ENMT: No sore throat, oral thrush, ulcers or exudate  Respiratory: No cough, no SOB  Cardiovascular:  No chest pain, palpitations or edema   Gastrointestinal: No pain, nausea, vomiting, diarrhea or constipation	  Genitourinary: No dysuria, discharge or flank pain  MSK: No arthralgias or back pain   Neurological: No HA, no weakness, no seizures, no AMS       prior hospital charts reviewed [V]  primary team notes reviewed [V]  other consultant notes reviewed [V]    PAST MEDICAL & SURGICAL HISTORY:  Diabetes mellitus      No significant past surgical history          SOCIAL HISTORY:  - Denied smoking/vaping/alcohol/recreational drug use    FAMILY HISTORY:  FH: diabetes mellitus (Father, Mother)        Allergies  No Known Allergies        ANTIMICROBIALS:  acyclovir IVPB 700 every 8 hours      ANTIMICROBIALS (past 90 days):  MEDICATIONS  (STANDING):  acyclovir IVPB   114 mL/Hr IV Intermittent (04-25-24 @ 00:18)    acyclovir IVPB   114 mL/Hr IV Intermittent (04-25-24 @ 08:34)        OTHER MEDS:   MEDICATIONS  (STANDING):  acetaminophen     Tablet .. 975 every 6 hours PRN  dextrose 50% Injectable 12.5 once  dextrose 50% Injectable 25 once  dextrose Oral Gel 15 once PRN  glucagon  Injectable 1 once  insulin lispro (ADMELOG) corrective regimen sliding scale  at bedtime  insulin lispro (ADMELOG) corrective regimen sliding scale  three times a day before meals  insulin lispro Injectable (ADMELOG) 5 three times a day before meals  ketorolac   Injectable 15 every 6 hours PRN      VITALS:  Vital Signs Last 24 Hrs  T(F): 97.8 (04-25-24 @ 08:01), Max: 98.2 (04-25-24 @ 05:30)    Vital Signs Last 24 Hrs  HR: 70 (04-25-24 @ 08:01) (70 - 80)  BP: 148/73 (04-25-24 @ 08:01) (131/75 - 157/78)  RR: 18 (04-25-24 @ 08:01)  SpO2: 100% (04-25-24 @ 08:01) (96% - 100%)  Wt(kg): --    EXAM:  General: Patient in no acute distress  HEENT: NCAT, EOMI, PERRL, no oral lesions  CV: S1+S2, no m/r/g appreciated  Lungs: No respiratory distress, CTAB  Abd: Soft, nontender, no guarding  Ext: No cyanosis, no edema  Neuro: Alert and oriented, no focal deficits  Skin: Vesicular rash on erythematous base involving the S2 dermatome on R buttocks, mostly crusted over, and R vulvar swelling  IV: No phlebitis      Labs:                        13.2   6.64  )-----------( 218      ( 25 Apr 2024 05:43 )             39.8     04-25    135  |  100  |  14  ----------------------------<  436<H>  4.5   |  22  |  0.83    Ca    9.4      25 Apr 2024 01:35    TPro  6.7  /  Alb  4.0  /  TBili  0.4  /  DBili  0.1  /  AST  44<H>  /  ALT  84<H>  /  AlkPhos  71  04-25      WBC Trend:  WBC Count: 6.64 (04-25-24 @ 05:43)  WBC Count: 8.97 (04-24-24 @ 21:22)      Auto Neutrophil #: 2.72 K/uL (04-25-24 @ 05:43)  Auto Neutrophil #: 5.20 K/uL (04-24-24 @ 21:22)      Creatine Trend:  Creatinine: 0.83 (04-25)  Creatinine: 0.76 (04-24)      Liver Biochemical Testing Trend:  Alanine Aminotransferase (ALT/SGPT): 84 *H* (04-25)  Alanine Aminotransferase (ALT/SGPT): 98 *H* (04-24)  Aspartate Aminotransferase (AST/SGOT): 44 (04-25-24 @ 01:35)  Aspartate Aminotransferase (AST/SGOT): 66 (04-24-24 @ 21:22)  Bilirubin Total: 0.4 (04-25)  Bilirubin Direct: 0.1 (04-25)  Bilirubin Total: 0.5 (04-24)    Auto Eosinophil %: 3.0 % (04-25-24 @ 05:43)  Auto Eosinophil %: 2.0 % (04-24-24 @ 21:22)      Urinalysis Basic - ( 25 Apr 2024 01:35 )    Color: x / Appearance: x / SG: x / pH: x  Gluc: 436 mg/dL / Ketone: x  / Bili: x / Urobili: x   Blood: x / Protein: x / Nitrite: x   Leuk Esterase: x / RBC: x / WBC x   Sq Epi: x / Non Sq Epi: x / Bacteria: x        MICROBIOLOGY:  Blood Gas Venous - Lactate: 1.4 (04-24 @ 21:00)    RADIOLOGY: No radiology to review
RODRI العلي  68y  Female [id number]    HPI: 69yo postmenopausal  (LMP age 57) presenting with diffuse rash in groin. Pt states rash/pain began ~6 days ago and became progressively more 'swollen' and uncomfortable, though reports swelling has gone down somewhat over the last day. Originally presented to Urgent Care were where she was prescribed Keflex without relief. She went her GYN today and where culture of lesions was performed and patient was sent to hospital for evaluation. In the ED, pt states she continues to have discomfort, mostly on the R side, especially with sitting and movement. Reports subjective chills yesterday but denies fevers, n/v, CP, SOB, abdominal pain, dysuria, changes in bowel habits. Pt has never had rash like this before. Reports h/o chicken pox in her 30s.      Name of GYN Physician: Dr. Cordova    ObHx:  - FT  x2  GynHx: +h/o fibroids s/p ?HSC myomectomy. Denies cysts, endometriosis, STIs, Abnormal pap smears   PMHx: T2DM, HLD  SurgHx: D&C  Meds: glulisine //u w/ meals, glargine 50u mid-day, metformin 500mg qd  Allergies: NKA  Social History:  Denies smoking use, drug use, alcohol use.    Vital Signs Last 24 Hrs  T(C): 36.4 (2024 23:05), Max: 36.4 (2024 19:27)  T(F): 97.6 (2024 23:05), Max: 97.6 (2024 19:27)  HR: 72 (2024 23:05) (72 - 80)  BP: 154/77 (2024 23:05) (131/75 - 154/77)  RR: 18 (2024 23:05) (18 - 18)  SpO2: 97% (2024 23:05) (96% - 97%)    Parameters below as of 2024 23:05  Patient On (Oxygen Delivery Method): room air      Physical Exam:   General: lying comfortably in bed, NAD   HEENT: neck supple, full ROM  CV: clinically well perfused  Lungs: unlabored respirations  Back: No CVA tenderness  Abd: Soft, non-tender, non-distended, no rebound or guarding  : Small pustular lesions with slightly erythematous base on R labium majora extending around R buttock, no crusting appreciated, some lesions closed and others draining small amount of yellowish fluid  Ext: non-tender b/l, no edema       LABS:                        15.6   8.97  )-----------( 243      ( 2024 21:22 )             46.3     04-24    134<L>  |  97  |  12  ----------------------------<  289<H>  4.7   |  23  |  0.76    Ca    10.0      2024 21:22    TPro  7.9  /  Alb  4.3  /  TBili  0.5  /  DBili  x   /  AST  66<H>  /  ALT  98<H>  /  AlkPhos  70  04-24      Urinalysis Basic - ( 2024 21:22 )    Color: x / Appearance: x / SG: x / pH: x  Gluc: 289 mg/dL / Ketone: x  / Bili: x / Urobili: x   Blood: x / Protein: x / Nitrite: x   Leuk Esterase: x / RBC: x / WBC x   Sq Epi: x / Non Sq Epi: x / Bacteria: x

## 2024-04-25 NOTE — ED ADULT NURSE REASSESSMENT NOTE - NS ED NURSE REASSESS COMMENT FT1
Pharmacy contacted for 700mg acyclovir, will tube to station 52
Pt received from RN Arun Boothe. Pt A&O X4, oriented to CDU & plan of care discussed. Pt in CDU for Pain control, IV antiviral, and ID consult. Rash noted over Rt labia extending to rt buttock. Pt c/o pain perineal area, MUKUND Mcclain notified. IV Toradol given as ordered with good relief. , Insulin given as ordered. Pt denies any chest pain, SOB, dizziness or palpitations. V/S stable, IV 20G, Lt AC, patent and free of signs of infiltration. Pt OOB independently. Safety & comfort measures maintained. Call bell in reach. Will continue to monitor.
Received report from NEFTALI Moreno. Pt AOx4 with stable VS. Comfort care and safety measures provided.
07.00 Received the Pt from  NEFTALI Dillon Pt is Observed for Shingles / vaginal  / buttock area Received the Pt A&OX 4  Pt obeys commands Shelby N/V/D fever chills cp SOB   Comfort care & safety measures continued  IV site looks clean & dry no signs of infiltration noted pt denies  pain IV site .Pt is oriented to the unit Plan of care explained .  Pt is advised to call for help  call bell with in the reach pt verbalized the understanding . Pt evaluated by CDU MD Mathew Lockwood . GCS 15/15 A&OX 4 PERRLA  size 3 Strong upper & lower extremities steady gait  Pt is ambulatory & independent   No facial droop  No Hand Leg drop denies numbness tingling  Pt is in severe pain  medicated as per order   Plan of care ongoing

## 2024-04-25 NOTE — ED CDU PROVIDER INITIAL DAY NOTE - CLINICAL SUMMARY MEDICAL DECISION MAKING FREE TEXT BOX
Adult female sent by GYN Md for concerns for herpetic lesions vulva. Exam more cw shingles than genital herpes. Has mod swelling pain cdu for acyclovir and ID cs in am  Florentino Sahni MD, Facep

## 2024-04-25 NOTE — H&P ADULT - ASSESSMENT
69yo F PMHx IDDM who sent to the ED for IV anti-virals due to concern for labial herpes infection. Her rash started as a painful sore in the R inner labia which she first noticed on 4/18. She went to an urgent care who prescribed her cephalexin and mupirocin ointment, with continued worsening of the rash which then extended to R buttock with associated fevers/chills. She has not been sexually active for the past 10 years and had chickenpox diagnosed at age 32 while pregnant. She has not previously received the Shingles vaccine.    Labial rash  - likely VZ  - ID fu appreciated   - contact precautions  - will deidraior     DM  - monitor FS  - ISS    Heparin sc for DVT prophylaxis

## 2024-04-25 NOTE — H&P ADULT - NSHPPHYSICALEXAM_GEN_ALL_CORE
General: WN/WD NAD  PERRLA  Neurology: A&Ox3, nonfocal, SINGER x 4  Respiratory: CTA B/L  CV: RRR, S1S2, no murmurs, rubs or gallops  Abdominal: Soft, NT, ND +BS, Last BM  Extremities: No edema, + peripheral pulses  Skin Normal

## 2024-04-26 ENCOUNTER — TRANSCRIPTION ENCOUNTER (OUTPATIENT)
Age: 69
End: 2024-04-26

## 2024-04-26 VITALS
TEMPERATURE: 98 F | DIASTOLIC BLOOD PRESSURE: 76 MMHG | SYSTOLIC BLOOD PRESSURE: 130 MMHG | OXYGEN SATURATION: 97 % | RESPIRATION RATE: 18 BRPM | HEART RATE: 73 BPM

## 2024-04-26 LAB
GLUCOSE BLDC GLUCOMTR-MCNC: 216 MG/DL — HIGH (ref 70–99)
GLUCOSE BLDC GLUCOMTR-MCNC: 86 MG/DL — SIGNIFICANT CHANGE UP (ref 70–99)

## 2024-04-26 PROCEDURE — 36415 COLL VENOUS BLD VENIPUNCTURE: CPT

## 2024-04-26 PROCEDURE — 99232 SBSQ HOSP IP/OBS MODERATE 35: CPT

## 2024-04-26 PROCEDURE — 85018 HEMOGLOBIN: CPT

## 2024-04-26 PROCEDURE — 82803 BLOOD GASES ANY COMBINATION: CPT

## 2024-04-26 PROCEDURE — 82435 ASSAY OF BLOOD CHLORIDE: CPT

## 2024-04-26 PROCEDURE — 84295 ASSAY OF SERUM SODIUM: CPT

## 2024-04-26 PROCEDURE — 96374 THER/PROPH/DIAG INJ IV PUSH: CPT

## 2024-04-26 PROCEDURE — 99285 EMERGENCY DEPT VISIT HI MDM: CPT | Mod: 25

## 2024-04-26 PROCEDURE — 80076 HEPATIC FUNCTION PANEL: CPT

## 2024-04-26 PROCEDURE — 82330 ASSAY OF CALCIUM: CPT

## 2024-04-26 PROCEDURE — 80053 COMPREHEN METABOLIC PANEL: CPT

## 2024-04-26 PROCEDURE — 85025 COMPLETE CBC W/AUTO DIFF WBC: CPT

## 2024-04-26 PROCEDURE — 83036 HEMOGLOBIN GLYCOSYLATED A1C: CPT

## 2024-04-26 PROCEDURE — 96376 TX/PRO/DX INJ SAME DRUG ADON: CPT

## 2024-04-26 PROCEDURE — 87529 HSV DNA AMP PROBE: CPT

## 2024-04-26 PROCEDURE — 87070 CULTURE OTHR SPECIMN AEROBIC: CPT

## 2024-04-26 PROCEDURE — 83605 ASSAY OF LACTIC ACID: CPT

## 2024-04-26 PROCEDURE — 84132 ASSAY OF SERUM POTASSIUM: CPT

## 2024-04-26 PROCEDURE — 85014 HEMATOCRIT: CPT

## 2024-04-26 PROCEDURE — 80048 BASIC METABOLIC PNL TOTAL CA: CPT

## 2024-04-26 PROCEDURE — 96375 TX/PRO/DX INJ NEW DRUG ADDON: CPT

## 2024-04-26 PROCEDURE — 82962 GLUCOSE BLOOD TEST: CPT

## 2024-04-26 PROCEDURE — G0378: CPT

## 2024-04-26 PROCEDURE — 87798 DETECT AGENT NOS DNA AMP: CPT

## 2024-04-26 PROCEDURE — 82947 ASSAY GLUCOSE BLOOD QUANT: CPT

## 2024-04-26 RX ORDER — INSULIN GLULISINE 100 [IU]/ML
20 INJECTION, SOLUTION SUBCUTANEOUS
Refills: 0 | DISCHARGE

## 2024-04-26 RX ORDER — CYCLOSPORINE 0.5 MG/ML
1 EMULSION OPHTHALMIC
Refills: 0 | DISCHARGE

## 2024-04-26 RX ORDER — INSULIN GLARGINE AND LIXISENATIDE 100; 33 U/ML; UG/ML
48 INJECTION, SOLUTION SUBCUTANEOUS
Refills: 0 | DISCHARGE

## 2024-04-26 RX ORDER — VALACYCLOVIR 500 MG/1
1 TABLET, FILM COATED ORAL
Qty: 16 | Refills: 0
Start: 2024-04-26 | End: 2024-04-30

## 2024-04-26 RX ORDER — DORZOLAMIDE HYDROCHLORIDE TIMOLOL MALEATE 20; 5 MG/ML; MG/ML
1 SOLUTION/ DROPS OPHTHALMIC
Refills: 0 | DISCHARGE

## 2024-04-26 RX ORDER — ATORVASTATIN CALCIUM 80 MG/1
1 TABLET, FILM COATED ORAL
Refills: 0 | DISCHARGE

## 2024-04-26 RX ORDER — METFORMIN HYDROCHLORIDE 850 MG/1
1 TABLET ORAL
Refills: 0 | DISCHARGE

## 2024-04-26 RX ADMIN — HEPARIN SODIUM 5000 UNIT(S): 5000 INJECTION INTRAVENOUS; SUBCUTANEOUS at 05:27

## 2024-04-26 RX ADMIN — Medication 114 MILLIGRAM(S): at 11:00

## 2024-04-26 RX ADMIN — Medication 114 MILLIGRAM(S): at 00:44

## 2024-04-26 RX ADMIN — Medication 2: at 13:29

## 2024-04-26 RX ADMIN — Medication 975 MILLIGRAM(S): at 01:25

## 2024-04-26 RX ADMIN — HEPARIN SODIUM 5000 UNIT(S): 5000 INJECTION INTRAVENOUS; SUBCUTANEOUS at 13:30

## 2024-04-26 RX ADMIN — Medication 5 UNIT(S): at 13:30

## 2024-04-26 RX ADMIN — Medication 975 MILLIGRAM(S): at 10:26

## 2024-04-26 NOTE — DISCHARGE NOTE NURSING/CASE MANAGEMENT/SOCIAL WORK - NSDCPEFALRISK_GEN_ALL_CORE
For information on Fall & Injury Prevention, visit: [email or web address] OR  [email or web address] OR  https://www.cdc.gov/steadi/patient.html

## 2024-04-26 NOTE — PHARMACOTHERAPY INTERVENTION NOTE - COMMENTS
Confirmed home medications with patient and pharmacy, updated in Outpatient Medication Review.    Home Medications:  Apidra 100 units/mL injectable solution: 20 unit(s) injectable 3 times a day (with meals)  Soliqua 100/33 subcutaneous solution: 48 unit(s) subcutaneous once a day (at bedtime)    Added:  atorvastatin 10 mg oral tablet: 1 tab(s) orally once a day  dorzolamide-timolol 2.23%-0.68% (2%-0.5% base) preservative-free ophthalmic solution: 1 drop(s) in each eye 2 times a day  MetFORMIN (Eqv-Glumetza) 500 mg oral tablet, extended release: 1 tab(s) orally 2 times a day  Restasis 0.05% ophthalmic emulsion: 1 drop(s) in each eye 2 times a day    Deon Pond PharmD  Transitions of Care Pharmacist  Available on [employer] Teams  Spectra #[phone]

## 2024-04-26 NOTE — DISCHARGE NOTE PROVIDER - CARE PROVIDER_API CALL
Damion Díaz  Endocrinology/Metab/Diabetes  [address], [address]  [place] [address]  Phone: [phone]  Fax: [phone]  Established Patient  Follow Up Time: 1 week   Damion Díaz  Endocrinology/Metab/Diabetes  [address], [address]  [place] [address]  Phone: [phone]  Fax: [phone]  Established Patient  Follow Up Time: 1 week    Maria Allen MD  Phone: (   )    -  Fax: (   )    -  Follow Up Time: 1 week

## 2024-04-26 NOTE — DISCHARGE NOTE PROVIDER - NSDCCPCAREPLAN_GEN_ALL_CORE_FT
PRINCIPAL DISCHARGE DIAGNOSIS  Diagnosis: Shingles  Assessment and Plan of Treatment: You were admitted for shingles aka varicella zoster. Infectious disease saw you and recommended oral valtrex three times a day until 5/1. Keep the lesions covered until they have crusted over. Follow up with your PCP when possible.

## 2024-04-26 NOTE — DISCHARGE NOTE PROVIDER - HOSPITAL COURSE
HPI:  69yo F PMHx IDDM who sent to the ED for IV anti-virals due to concern for labial herpes infection. Her rash started as a painful sore in the R inner labia which she first noticed on 4/18. She went to an urgent care who prescribed her cephalexin and mupirocin ointment, with continued worsening of the rash which then extended to R buttock with associated fevers/chills. She has not been sexually active for the past 10 years and had chickenpox diagnosed at age 32 while pregnant. She has not previously received the Shingles vaccine.    (25 Apr 2024 13:46)    Hospital Course:  Pt with labial rash likely VZ. Gyn consulted and recommended treatment for shingles with antivirals. ID rec PO valacyclovir 1 g 3 times daily for total 7 days of treatment until 5/1. Pt recommended to keep lesions covered until crusted over    Important Medication Changes and Reason: PO valtrex 1g TID to complete total 7 days until 5/1    Active or Pending Issues Requiring Follow-up: fu GYN    Advanced Directives:   [x] Full code  [ ] DNR  [ ] Hospice    Discharge Diagnoses: shingles

## 2024-04-26 NOTE — DISCHARGE NOTE PROVIDER - PROVIDER TOKENS
PROVIDER:[TOKEN:[[id number]],FOLLOWUP:[1 week],ESTABLISHEDPATIENT:[T]] PROVIDER:[TOKEN:[[id number]],FOLLOWUP:[1 week],ESTABLISHEDPATIENT:[T]],FREE:[LAST:[D'sa],FIRST:[Maria],PHONE:[(   )    -],FAX:[(   )    -],ADDRESS:[Primary MD],FOLLOWUP:[1 week]]

## 2024-04-26 NOTE — PROGRESS NOTE ADULT - SUBJECTIVE AND OBJECTIVE BOX
Follow Up:  VZV    Interval History/ROS:  Overnight: No acute events.  Patient remains afebrile.  Otherwise hemodynamically stable on room air.      Patient seen examined at bedside.  Denies any new pain or discomfort.      Allergies  No Known Allergies        ANTIMICROBIALS:  acyclovir IVPB 700 every 8 hours      OTHER MEDS:  MEDICATIONS  (STANDING):  acetaminophen     Tablet .. 975 every 6 hours PRN  dextrose 50% Injectable 12.5 once  dextrose 50% Injectable 25 once  dextrose Oral Gel 15 once PRN  glucagon  Injectable 1 once  heparin   Injectable 5000 every 8 hours  insulin lispro (ADMELOG) corrective regimen sliding scale  three times a day before meals  insulin lispro (ADMELOG) corrective regimen sliding scale  at bedtime  insulin lispro Injectable (ADMELOG) 5 three times a day before meals  ketorolac   Injectable 15 every 6 hours PRN      Vital Signs Last 24 Hrs  T(C): 36.7 (26 Apr 2024 11:31), Max: 36.7 (26 Apr 2024 04:59)  T(F): 98 (26 Apr 2024 11:31), Max: 98.1 (26 Apr 2024 04:59)  HR: 72 (26 Apr 2024 11:31) (66 - 80)  BP: 137/75 (26 Apr 2024 11:31) (126/78 - 166/70)  BP(mean): --  RR: 18 (26 Apr 2024 11:31) (18 - 18)  SpO2: 96% (26 Apr 2024 11:31) (95% - 97%)    Parameters below as of 26 Apr 2024 11:31  Patient On (Oxygen Delivery Method): room air        PHYSICAL EXAM:  General: Patient in no acute distress  HEENT: NCAT, EOMI, PERRL, no oral lesions  CV: S1+S2, no m/r/g appreciated  Lungs: No respiratory distress, CTAB  Abd: Soft, nontender, no guarding  Ext: No cyanosis, no edema  Neuro: Alert and oriented, no focal deficits  Skin: Vesicular rash on erythematous base involving the S2 dermatome on R buttocks, mostly crusted over, and R vulvar swelling  IV: No phlebitis                                  13.2   6.64  )-----------( 218      ( 25 Apr 2024 05:43 )             39.8       04-25    135  |  100  |  14  ----------------------------<  436<H>  4.5   |  22  |  0.83    Ca    9.4      25 Apr 2024 01:35    TPro  6.7  /  Alb  4.0  /  TBili  0.4  /  DBili  0.1  /  AST  44<H>  /  ALT  84<H>  /  AlkPhos  71  04-25      Urinalysis Basic - ( 25 Apr 2024 01:35 )    Color: x / Appearance: x / SG: x / pH: x  Gluc: 436 mg/dL / Ketone: x  / Bili: x / Urobili: x   Blood: x / Protein: x / Nitrite: x   Leuk Esterase: x / RBC: x / WBC x   Sq Epi: x / Non Sq Epi: x / Bacteria: x        MICROBIOLOGY:  v    Culture - Genital (collected 24 Apr 2024 23:40)  Source: .Genital Vaginal  Preliminary Report (25 Apr 2024 22:57):    Normal genital chan isolated                    RADIOLOGY:  Imaging reviewed

## 2024-04-26 NOTE — DISCHARGE NOTE NURSING/CASE MANAGEMENT/SOCIAL WORK - PATIENT PORTAL LINK FT
You can access the FollowMyHealth Patient Portal offered by [facility] by registering at the following website: [email or web address]. By joining [facility]’s FollowMyHealth portal, you will also be able to view your health information using other applications (apps) compatible with our system.

## 2024-04-26 NOTE — DISCHARGE NOTE PROVIDER - NSDCMRMEDTOKEN_GEN_ALL_CORE_FT
Apidra 100 units/mL injectable solution: 20 unit(s) injectable 3 times a day (with meals)  atorvastatin 10 mg oral tablet: 1 tab(s) orally once a day  dorzolamide-timolol 2.23%-0.68% (2%-0.5% base) preservative-free ophthalmic solution: 1 drop(s) in each eye 2 times a day  MetFORMIN (Eqv-Glumetza) 500 mg oral tablet, extended release: 1 tab(s) orally 2 times a day  Restasis 0.05% ophthalmic emulsion: 1 drop(s) in each eye 2 times a day  Soliqua 100/33 subcutaneous solution: 48 unit(s) subcutaneous once a day (at bedtime)   Apidra 100 units/mL injectable solution: 20 unit(s) injectable 3 times a day (with meals)  atorvastatin 10 mg oral tablet: 1 tab(s) orally once a day  dorzolamide-timolol 2.23%-0.68% (2%-0.5% base) preservative-free ophthalmic solution: 1 drop(s) in each eye 2 times a day  MetFORMIN (Eqv-Glumetza) 500 mg oral tablet, extended release: 1 tab(s) orally 2 times a day  Restasis 0.05% ophthalmic emulsion: 1 drop(s) in each eye 2 times a day  Soliqua 100/33 subcutaneous solution: 48 unit(s) subcutaneous once a day (at bedtime)  Valtrex 1 g oral tablet: 1 tab(s) orally 3 times a day

## 2024-04-26 NOTE — PROGRESS NOTE ADULT - REASON FOR ADMISSION
[id number]  Belongings Returned:
labial rash

## 2024-04-26 NOTE — PROGRESS NOTE ADULT - ASSESSMENT
71-year-old female with a past medical history significant for diabetes who presented to the hospital due to a rash.     Patient reports that about a week prior to admission, developed a painful vaginal rash.  Had presented to urgent care and was given cephalexin however this was ineffective in alleviating her symptoms.  Patient was then seen by OB/GYN on an outpatient basis and was then referred to the hospital due to concern for a severe herpetic rash.     Further history obtained, patient reports that the rash started as a painful sore in the right inner labia then became more diffuse extending to the right buttock.  Samples were obtained during pelvic exam and are pending.  Patient does report subjective fevers and chills.  Denies any other localizing or systemic symptoms.     Upon arrival, evaluated by inpatient OB/GYN service.  Patient remains afebrile hemodynamically stable.  Labs show no leukocytosis, BMP with renal function within normal limits, AST 44, ALT 84.  Culture and lesion PCR was obtained and is pending.  Patient was started on IV acyclovir upon admission.     #Zoster rash  #VZV infection    Recommendations   Overall, rash continues to resolve  Does not cross dermatomes, is not disseminated, can resume standard precautions  Keep lesions covered until all are crusted over  Would discontinue IV acyclovir and start p.o. valacyclovir 1 g 3 times daily  Plan for 7 days total of treatment, end date: 5/2/24  Follow fever curve and WBC count    ID to sign off. Please contact as issues arise.    Edson Szymanski MD  Division of Infectious Diseases 71-year-old female with a past medical history significant for diabetes who presented to the hospital due to a rash.     Patient reports that about a week prior to admission, developed a painful vaginal rash.  Had presented to urgent care and was given cephalexin however this was ineffective in alleviating her symptoms.  Patient was then seen by OB/GYN on an outpatient basis and was then referred to the hospital due to concern for a severe herpetic rash.     Further history obtained, patient reports that the rash started as a painful sore in the right inner labia then became more diffuse extending to the right buttock.  Samples were obtained during pelvic exam and are pending.  Patient does report subjective fevers and chills.  Denies any other localizing or systemic symptoms.     Upon arrival, evaluated by inpatient OB/GYN service.  Patient remains afebrile hemodynamically stable.  Labs show no leukocytosis, BMP with renal function within normal limits, AST 44, ALT 84.  Culture and lesion PCR was obtained and is pending.  Patient was started on IV acyclovir upon admission.     #Zoster rash  #VZV infection    Recommendations   Overall, rash continues to resolve  Does not cross dermatomes, is not disseminated, can resume standard precautions  Keep lesions covered until all are crusted over  Would discontinue IV acyclovir and start p.o. valacyclovir 1 g 3 times daily  Plan for 7 days total of treatment, end date: 5/2/24  Advised for zoster vaccine to be given at a later date  Follow fever curve and WBC count    ID to sign off. Please contact as issues arise.    Edson Szymanski MD  Division of Infectious Diseases

## 2024-04-26 NOTE — DISCHARGE NOTE PROVIDER - NSDCFUADDAPPT_GEN_ALL_CORE_FT
APPTS ARE READY TO BE MADE: [x] YES    Best Family or Patient Contact (if needed):    Additional Information about above appointments (if needed):    1:   2:   3:     Other comments or requests:    APPTS ARE READY TO BE MADE: [x] YES    Best Family or Patient Contact (if needed):    Additional Information about above appointments (if needed):    1:   2:   3:     Other comments or requests:     Appointment was scheduled by our team on the patient's behalf through the provider's office. Patient is scheduled with Dr. Allen 5/6 3:15pm [address].    Appointment was scheduled by our team on the patient's behalf through the provider's office. Patient is scheduled with Dr. Díaz 5/14 3:00pm [address] [place] [address]

## 2024-04-27 LAB
CULTURE RESULTS: SIGNIFICANT CHANGE UP
SPECIMEN SOURCE: SIGNIFICANT CHANGE UP